# Patient Record
Sex: MALE | Race: WHITE
[De-identification: names, ages, dates, MRNs, and addresses within clinical notes are randomized per-mention and may not be internally consistent; named-entity substitution may affect disease eponyms.]

---
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
## 2023-08-07 NOTE — XR
EXAMINATION TYPE: XR Hip LT and AP Pelvis

 

DATE OF EXAM: 12/7/2019

 

COMPARISON: NONE

 

HISTORY: Pain

 

TECHNIQUE: A single AP view of the pelvis is obtained. Two views of the left hip are obtained.  

 

FINDINGS:  There is no acute fracture/dislocation evident in the pelvis.  The hip and sacroiliac join
ts appear symmetric and unremarkable.  The overlying soft tissue appears unremarkable.

 

Two views of left hip show no acute fracture or dislocation.  No focal lytic or sclerotic lesion seen
 in the proximal left femur.  The overlying soft tissue is unremarkable.  

 

IMPRESSION:  There is no acute fracture or dislocation in the pelvis or left hip. FORM:[employer]    Left By:Fax    Instructions: please sign and date    To Be Returned By:ASA    For Further Information, the patient may be contacted at:HOME: [phone]   WORK: N/A   CELL: [phone]       Left original copy in [place] mailbox and extra copy in MD folder. Son More